# Patient Record
Sex: MALE | Race: WHITE | NOT HISPANIC OR LATINO | Employment: OTHER | URBAN - METROPOLITAN AREA
[De-identification: names, ages, dates, MRNs, and addresses within clinical notes are randomized per-mention and may not be internally consistent; named-entity substitution may affect disease eponyms.]

---

## 2018-08-20 ENCOUNTER — TRANSCRIBE ORDERS (OUTPATIENT)
Dept: SLEEP CENTER | Facility: CLINIC | Age: 68
End: 2018-08-20

## 2018-08-20 DIAGNOSIS — R06.83 SNORING: Primary | ICD-10-CM

## 2020-02-25 PROCEDURE — 87205 SMEAR GRAM STAIN: CPT | Performed by: PHYSICIAN ASSISTANT

## 2020-02-25 PROCEDURE — 87070 CULTURE OTHR SPECIMN AEROBIC: CPT | Performed by: PHYSICIAN ASSISTANT

## 2021-03-10 DIAGNOSIS — Z23 ENCOUNTER FOR IMMUNIZATION: ICD-10-CM

## 2024-12-27 ENCOUNTER — HOSPITAL ENCOUNTER (EMERGENCY)
Facility: HOSPITAL | Age: 74
Discharge: HOME | End: 2024-12-27
Attending: EMERGENCY MEDICINE
Payer: MEDICARE

## 2024-12-27 VITALS
BODY MASS INDEX: 27.35 KG/M2 | TEMPERATURE: 98.6 F | OXYGEN SATURATION: 98 % | DIASTOLIC BLOOD PRESSURE: 79 MMHG | RESPIRATION RATE: 16 BRPM | WEIGHT: 185.19 LBS | HEART RATE: 61 BPM | SYSTOLIC BLOOD PRESSURE: 193 MMHG

## 2024-12-27 DIAGNOSIS — R03.0 ELEVATED BLOOD PRESSURE READING: ICD-10-CM

## 2024-12-27 DIAGNOSIS — R79.89 ELEVATED SERUM CREATININE: ICD-10-CM

## 2024-12-27 DIAGNOSIS — J02.9 PHARYNGITIS: Primary | ICD-10-CM

## 2024-12-27 LAB
ALBUMIN SERPL BCG-MCNC: 3.6 G/DL (ref 3.5–5)
ALP SERPL-CCNC: 85 U/L (ref 34–104)
ALT SERPL W P-5'-P-CCNC: 23 U/L (ref 7–52)
ANION GAP SERPL CALCULATED.3IONS-SCNC: 4 MMOL/L (ref 4–13)
AST SERPL W P-5'-P-CCNC: 21 U/L (ref 13–39)
BASOPHILS # BLD AUTO: 0.03 THOUSANDS/ÂΜL (ref 0–0.1)
BASOPHILS NFR BLD AUTO: 0 % (ref 0–1)
BILIRUB SERPL-MCNC: 1.75 MG/DL (ref 0.2–1)
BILIRUB UR QL STRIP: NEGATIVE
BUN SERPL-MCNC: 23 MG/DL (ref 5–25)
CALCIUM SERPL-MCNC: 7.9 MG/DL (ref 8.4–10.2)
CHLORIDE SERPL-SCNC: 110 MMOL/L (ref 96–108)
CLARITY UR: CLEAR
CO2 SERPL-SCNC: 22 MMOL/L (ref 21–32)
COLOR UR: COLORLESS
CREAT SERPL-MCNC: 1.32 MG/DL (ref 0.6–1.3)
EOSINOPHIL # BLD AUTO: 0.1 THOUSAND/ÂΜL (ref 0–0.61)
EOSINOPHIL NFR BLD AUTO: 1 % (ref 0–6)
ERYTHROCYTE [DISTWIDTH] IN BLOOD BY AUTOMATED COUNT: 14 % (ref 11.6–15.1)
GFR SERPL CREATININE-BSD FRML MDRD: 52 ML/MIN/1.73SQ M
GLUCOSE SERPL-MCNC: 89 MG/DL (ref 65–140)
GLUCOSE UR STRIP-MCNC: NEGATIVE MG/DL
HCT VFR BLD AUTO: 45.7 % (ref 36.5–49.3)
HGB BLD-MCNC: 15.1 G/DL (ref 12–17)
HGB UR QL STRIP.AUTO: NEGATIVE
IMM GRANULOCYTES # BLD AUTO: 0.02 THOUSAND/UL (ref 0–0.2)
IMM GRANULOCYTES NFR BLD AUTO: 0 % (ref 0–2)
KETONES UR STRIP-MCNC: NEGATIVE MG/DL
LEUKOCYTE ESTERASE UR QL STRIP: NEGATIVE
LYMPHOCYTES # BLD AUTO: 3.08 THOUSANDS/ÂΜL (ref 0.6–4.47)
LYMPHOCYTES NFR BLD AUTO: 35 % (ref 14–44)
MCH RBC QN AUTO: 30.1 PG (ref 26.8–34.3)
MCHC RBC AUTO-ENTMCNC: 33 G/DL (ref 31.4–37.4)
MCV RBC AUTO: 91 FL (ref 82–98)
MONOCYTES # BLD AUTO: 0.87 THOUSAND/ÂΜL (ref 0.17–1.22)
MONOCYTES NFR BLD AUTO: 10 % (ref 4–12)
NEUTROPHILS # BLD AUTO: 4.66 THOUSANDS/ÂΜL (ref 1.85–7.62)
NEUTS SEG NFR BLD AUTO: 54 % (ref 43–75)
NITRITE UR QL STRIP: NEGATIVE
NRBC BLD AUTO-RTO: 0 /100 WBCS
PH UR STRIP.AUTO: 5 [PH]
PLATELET # BLD AUTO: 219 THOUSANDS/UL (ref 149–390)
PMV BLD AUTO: 10.3 FL (ref 8.9–12.7)
POTASSIUM SERPL-SCNC: 4.2 MMOL/L (ref 3.5–5.3)
PROT SERPL-MCNC: 6.3 G/DL (ref 6.4–8.4)
PROT UR STRIP-MCNC: NEGATIVE MG/DL
RBC # BLD AUTO: 5.02 MILLION/UL (ref 3.88–5.62)
SODIUM SERPL-SCNC: 136 MMOL/L (ref 135–147)
SP GR UR STRIP.AUTO: 1 (ref 1–1.03)
UROBILINOGEN UR STRIP-ACNC: <2 MG/DL
WBC # BLD AUTO: 8.76 THOUSAND/UL (ref 4.31–10.16)

## 2024-12-27 PROCEDURE — 99284 EMERGENCY DEPT VISIT MOD MDM: CPT

## 2024-12-27 PROCEDURE — 99283 EMERGENCY DEPT VISIT LOW MDM: CPT

## 2024-12-27 PROCEDURE — 96361 HYDRATE IV INFUSION ADD-ON: CPT

## 2024-12-27 PROCEDURE — 36415 COLL VENOUS BLD VENIPUNCTURE: CPT

## 2024-12-27 PROCEDURE — 85025 COMPLETE CBC W/AUTO DIFF WBC: CPT

## 2024-12-27 PROCEDURE — 81003 URINALYSIS AUTO W/O SCOPE: CPT

## 2024-12-27 PROCEDURE — 80053 COMPREHEN METABOLIC PANEL: CPT

## 2024-12-27 PROCEDURE — 96374 THER/PROPH/DIAG INJ IV PUSH: CPT

## 2024-12-27 RX ORDER — ACETAMINOPHEN 500 MG
500 TABLET ORAL EVERY 6 HOURS PRN
Qty: 30 TABLET | Refills: 0 | Status: SHIPPED | OUTPATIENT
Start: 2024-12-27

## 2024-12-27 RX ORDER — LIDOCAINE HYDROCHLORIDE 20 MG/ML
15 SOLUTION OROPHARYNGEAL 4 TIMES DAILY PRN
Qty: 100 ML | Refills: 0 | Status: SHIPPED | OUTPATIENT
Start: 2024-12-27

## 2024-12-27 RX ORDER — LIDOCAINE HYDROCHLORIDE 20 MG/ML
15 SOLUTION OROPHARYNGEAL ONCE
Status: COMPLETED | OUTPATIENT
Start: 2024-12-27 | End: 2024-12-27

## 2024-12-27 RX ORDER — ACETAMINOPHEN 10 MG/ML
1000 INJECTION, SOLUTION INTRAVENOUS ONCE
Status: COMPLETED | OUTPATIENT
Start: 2024-12-27 | End: 2024-12-27

## 2024-12-27 RX ADMIN — LIDOCAINE HYDROCHLORIDE 15 ML: 20 SOLUTION ORAL at 19:51

## 2024-12-27 RX ADMIN — SODIUM CHLORIDE 2000 ML: 0.9 INJECTION, SOLUTION INTRAVENOUS at 19:48

## 2024-12-27 RX ADMIN — ACETAMINOPHEN 1000 MG: 10 INJECTION INTRAVENOUS at 19:50

## 2024-12-28 NOTE — ED PROVIDER NOTES
Time reflects when diagnosis was documented in both MDM as applicable and the Disposition within this note       Time User Action Codes Description Comment    12/27/2024 10:05 PM Mery Hammer Add [J02.9] Pharyngitis     12/27/2024 10:05 PM Mery Hammer Add [R79.89] Elevated serum creatinine     12/27/2024 10:09 PM Mery Hammer Add [R03.0] Elevated blood pressure reading           ED Disposition       ED Disposition   Discharge    Condition   Stable    Date/Time   Fri Dec 27, 2024 10:05 PM    Comment   Mayito Canass discharge to home/self care.                   Assessment & Plan       Medical Decision Making  Will give 2 L NS. Following hydration, Will obtain CBC to re-evaluate for hemoconcentration.  Will obtain repeat CMP to evaluate kidney function, for electrolyte disturbance. Will obtain UA.     Repeat lab work reveals improvement in creatinine, resolution of proteinuria, and resolution of hemoconcentration.  No actionable derangement.  On reexamination, patient reports improvement in sore throat following treatment in ED.  Patient is hypertensive at time of discharge, though he states he was unable to take his metoprolol today and will take when he gets home.  Given that he has no other symptoms such as chest pain, headache, dyspnea, will discharge.  Patient agreeable to follow-up closely with his nephrologist, who he did call today to inform him that he was going to the ED.    At the time of discharge, the patient is in no acute distress. I discussed with the patient the diagnosis, treatment plan, follow-up, return precautions, and discharge instructions; they were given the opportunity to ask questions and verbalized understanding.    Problems Addressed:  Elevated blood pressure reading: acute illness or injury  Elevated serum creatinine: acute illness or injury  Pharyngitis: acute illness or injury    Amount and/or Complexity of Data Reviewed  External Data Reviewed: labs and notes.  Labs:  ordered. Decision-making details documented in ED Course.  Discussion of management or test interpretation with external provider(s): ED attending (Dr. Duff)    Risk  OTC drugs.  Prescription drug management.        ED Course as of 12/28/24 0556   Fri Dec 27, 2024   1927 Baseline Cr 1.1   2108 Hemoglobin: 15.1   2108 Hematocrit: 45.7   2117 POCT URINE PROTEIN: Negative   2129 Creatinine(!): 1.32       Medications   sodium chloride 0.9 % bolus 2,000 mL (0 mL Intravenous Stopped 12/27/24 2104)   acetaminophen (Ofirmev) injection 1,000 mg (0 mg Intravenous Stopped 12/27/24 2005)   Lidocaine Viscous HCl (XYLOCAINE) 2 % mucosal solution 15 mL (15 mL Swish & Swallow Given 12/27/24 1951)       ED Risk Strat Scores                          SBIRT 20yo+      Flowsheet Row Most Recent Value   Initial Alcohol Screen: US AUDIT-C     1. How often do you have a drink containing alcohol? 0 Filed at: 12/27/2024 1824   2. How many drinks containing alcohol do you have on a typical day you are drinking?  0 Filed at: 12/27/2024 1824   3a. Male UNDER 65: How often do you have five or more drinks on one occasion? 0 Filed at: 12/27/2024 1824   3b. FEMALE Any Age, or MALE 65+: How often do you have 4 or more drinks on one occassion? 0 Filed at: 12/27/2024 1824   Audit-C Score 0 Filed at: 12/27/2024 1824   MIKE: How many times in the past year have you...    Used an illegal drug or used a prescription medication for non-medical reasons? Never Filed at: 12/27/2024 1824                            History of Present Illness       Chief Complaint   Patient presents with    Sore Throat - Complicated     Patient arrives with c/o a sore throat starting 2 days ago. Patient complains of difficulty swallowing and voice changes. Patient also recovering from a stomach virus and, per patient, his doctor told him he needs IV hydration.        Past Medical History:   Diagnosis Date    Glaucoma     Hypertension     Kidney transplanted       Past Surgical  History:   Procedure Laterality Date    CHOLECYSTECTOMY      EYE SURGERY      [place] LUMBAR PUNCTURE DIAGNOSTIC  5/17/2015    KIDNEY SURGERY      transplant    REPLACEMENT TOTAL KNEE        History reviewed. No pertinent family history.   Social History     Tobacco Use    Smoking status: Former     Types: Cigarettes     Start date: 2002    Smokeless tobacco: Never   Vaping Use    Vaping status: Never Used   Substance Use Topics    Alcohol use: Yes    Drug use: Never      E-Cigarette/Vaping    E-Cigarette Use Never User       E-Cigarette/Vaping Substances    Nicotine No     THC No     CBD No     Flavoring No     Other No     Unknown No       I have reviewed and agree with the history as documented.     The patient is a 74-year-old male with history of renal transplant 2010 who presents to the ED for evaluation of dehydration.  He reports that 6 days ago, he developed abdominal pain, vomiting, diarrhea.  These symptoms have since resolved, however yesterday he developed a change in his voice.  Today, he developed a sore throat, and pain with swallowing.  He has been able to tolerate orals liquids and solids, and oral secretions.  He reports he did have chills last night.  He was seen at urgent care during which she had labs done.  His creatinine was noted to be 1.4, hemoglobin 18.1, hematocrit 54.2, he is noted to have elevated monocytes at 8.7%, and protein in his urine.  COVID and flu testing were negative, as well as strep testing.  He was sent to the ED for hydration.  He initially went to an outside hospital and waited but was not seen, and came to our facility instead.  He otherwise denies chest pain, dyspnea, ongoing abdominal pain/nausea/vomiting, dysuria, hematuria, decreased urine output, recent travel.          Review of Systems   Constitutional:  Negative for chills and fever.   HENT:  Positive for sore throat. Negative for congestion and rhinorrhea.    Respiratory:  Negative for cough and shortness of breath.     Cardiovascular:  Negative for chest pain and leg swelling.   Gastrointestinal:  Positive for abdominal pain (resolved), diarrhea (resolved) and vomiting (resolved). Negative for constipation.   Genitourinary:  Negative for dysuria and flank pain.   Musculoskeletal:  Negative for arthralgias and myalgias.   Skin:  Negative for rash and wound.   Neurological:  Negative for dizziness, numbness and headaches.           Objective       ED Triage Vitals   Temperature Pulse Blood Pressure Respirations SpO2 Patient Position - Orthostatic VS   12/27/24 1820 12/27/24 1820 12/27/24 1823 12/27/24 1820 12/27/24 1823 12/27/24 1823   98.6 °F (37 °C) 81 161/74 18 98 % Sitting      Temp Source Heart Rate Source BP Location FiO2 (%) Pain Score    12/27/24 1820 12/27/24 1820 12/27/24 1823 -- --    Oral Monitor Right arm        Vitals      Date and Time Temp Pulse SpO2 Resp BP Pain Score FACES Pain Rating User   12/27/24 2208 -- 61 98 % 16 193/79 -- -- BRB   12/27/24 2104 -- 68 98 % 16 173/79 -- -- BRB   12/27/24 1823 -- -- 98 % -- 161/74 -- -- EK   12/27/24 1820 98.6 °F (37 °C) 81 -- 18 -- -- -- EK            Physical Exam  Vitals and nursing note reviewed.   Constitutional:       General: He is not in acute distress.     Appearance: He is well-developed. He is not toxic-appearing.   HENT:      Head: Normocephalic and atraumatic.      Mouth/Throat:      Mouth: Mucous membranes are dry. No angioedema.      Pharynx: Oropharynx is clear. Uvula midline. Posterior oropharyngeal erythema present. No oropharyngeal exudate or uvula swelling.      Comments: Normal phonation. Tolerating oral secretions  Eyes:      Conjunctiva/sclera: Conjunctivae normal.   Cardiovascular:      Rate and Rhythm: Normal rate and regular rhythm.      Heart sounds: No murmur heard.  Pulmonary:      Effort: Pulmonary effort is normal. No respiratory distress.      Breath sounds: Normal breath sounds.   Abdominal:      Palpations: Abdomen is soft.      Tenderness:  There is no abdominal tenderness. There is no guarding or rebound.   Musculoskeletal:         General: No swelling.      Cervical back: Neck supple.      Right lower leg: No edema.      Left lower leg: No edema.   Skin:     General: Skin is warm and dry.      Capillary Refill: Capillary refill takes less than 2 seconds.   Neurological:      Mental Status: He is alert.   Psychiatric:         Mood and Affect: Mood normal.         Results Reviewed       Procedure Component Value Units Date/Time    Comprehensive metabolic panel [[id number]]  (Abnormal) Collected: 12/27/24 2055    Lab Status: Final result Specimen: Blood from Arm, Right Updated: 12/27/24 2123     Sodium 136 mmol/L      Potassium 4.2 mmol/L      Chloride 110 mmol/L      CO2 22 mmol/L      ANION GAP 4 mmol/L      BUN 23 mg/dL      Creatinine 1.32 mg/dL      Glucose 89 mg/dL      Calcium 7.9 mg/dL      AST 21 U/L      ALT 23 U/L      Alkaline Phosphatase 85 U/L      Total Protein 6.3 g/dL      Albumin 3.6 g/dL      Total Bilirubin 1.75 mg/dL      eGFR 52 ml/min/1.73sq m     Narrative:      [employer] guidelines for Chronic Kidney Disease (CKD):     Stage 1 with normal or high GFR (GFR > 90 mL/min/1.73 square meters)    Stage 2 Mild CKD (GFR = 60-89 mL/min/1.73 square meters)    Stage 3A Moderate CKD (GFR = 45-59 mL/min/1.73 square meters)    Stage 3B Moderate CKD (GFR = 30-44 mL/min/1.73 square meters)    Stage 4 Severe CKD (GFR = 15-29 mL/min/1.73 square meters)    Stage 5 End Stage CKD (GFR <15 mL/min/1.73 square meters)  Note: GFR calculation is accurate only with a steady state creatinine    UA w Reflex to Microscopic w Reflex to Culture [[id number]] Collected: 12/27/24 2052    Lab Status: Final result Specimen: Urine, Other Updated: 12/27/24 2115     Color, UA Colorless     Clarity, UA Clear     Specific Gravity, UA 1.003     pH, UA 5.0     Leukocytes, UA Negative     Nitrite, UA Negative     Protein, UA Negative mg/dl       Glucose, UA Negative mg/dl      Ketones, UA Negative mg/dl      Urobilinogen, UA <2.0 mg/dl      Bilirubin, UA Negative     Occult Blood, UA Negative    CBC and differential [[id number]] Collected: 24    Lab Status: Final result Specimen: Blood from Arm, Right Updated: 24     WBC 8.76 Thousand/uL      RBC 5.02 Million/uL      Hemoglobin 15.1 g/dL      Hematocrit 45.7 %      MCV 91 fL      MCH 30.1 pg      MCHC 33.0 g/dL      RDW 14.0 %      MPV 10.3 fL      Platelets 219 Thousands/uL      nRBC 0 /100 WBCs      Segmented % 54 %      Immature Grans % 0 %      Lymphocytes % 35 %      Monocytes % 10 %      Eosinophils Relative 1 %      Basophils Relative 0 %      Absolute Neutrophils 4.66 Thousands/µL      Absolute Immature Grans 0.02 Thousand/uL      Absolute Lymphocytes 3.08 Thousands/µL      Absolute Monocytes 0.87 Thousand/µL      Eosinophils Absolute 0.10 Thousand/µL      Basophils Absolute 0.03 Thousands/µL             No orders to display       Procedures    ED Medication and Procedure Management   Prior to Admission Medications   Prescriptions Last Dose Informant Patient Reported? Taking?   ALPRAZolam (XANAX) 0.5 mg tablet  Self Yes No   Sig: Take 0.5 mg by mouth daily as needed   MAGNESIUM OXIDE 400 PO  Self Yes No   Sig: Take 400 mg by mouth 2 (two) times a day   bimatoprost (LUMIGAN) 0.01 % ophthalmic drops  Self Yes No   Sig: as directed   brimonidine (ALPHAGAN P) 0.1 %  Self Yes No   Sig: as directed   escitalopram (LEXAPRO) 20 mg tablet  Self Yes No   Sig: Take 1 tablet by mouth   metoprolol tartrate (LOPRESSOR) 25 mg tablet  Self Yes No   Sig: Take 25 mg by mouth   mycophenolic acid (MYFORTIC) 180 mg EC tablet  Self Yes No   Sig: Take 360 mg by mouth   tacrolimus (PROGRAF) 1 mg capsule  Self Yes No   Si capsule daily      Facility-Administered Medications: None     Discharge Medication List as of 2024 10:10 PM        START taking these medications    Details   acetaminophen  (TYLENOL) 500 mg tablet Take 1 tablet (500 mg total) by mouth every 6 (six) hours as needed for mild pain or moderate pain, Starting Fri 12/27/2024, Normal      Lidocaine Viscous HCl (XYLOCAINE) 2 % mucosal solution Swish and spit 15 mL 4 (four) times a day as needed for mouth pain or discomfort or mucositis, Starting Fri 12/27/2024, Normal           CONTINUE these medications which have NOT CHANGED    Details   ALPRAZolam (XANAX) 0.5 mg tablet Take 0.5 mg by mouth daily as needed, Historical Med      bimatoprost (LUMIGAN) 0.01 % ophthalmic drops as directed, Historical Med      brimonidine (ALPHAGAN P) 0.1 % as directed, Historical Med      escitalopram (LEXAPRO) 20 mg tablet Take 1 tablet by mouth, Historical Med      MAGNESIUM OXIDE 400 PO Take 400 mg by mouth 2 (two) times a day, Historical Med      metoprolol tartrate (LOPRESSOR) 25 mg tablet Take 25 mg by mouth, Historical Med      mycophenolic acid (MYFORTIC) 180 mg EC tablet Take 360 mg by mouth, Starting Mon 1/20/2020, Historical Med      tacrolimus (PROGRAF) 1 mg capsule 1 capsule daily, Historical Med           No discharge procedures on file.  ED SEPSIS DOCUMENTATION   Time reflects when diagnosis was documented in both MDM as applicable and the Disposition within this note       Time User Action Codes Description Comment    12/27/2024 10:05 PM Mery Hammer [J02.9] Pharyngitis     12/27/2024 10:05 PM Mery Hammer [R79.89] Elevated serum creatinine     12/27/2024 10:09 PM Mery Hammer [R03.0] Elevated blood pressure reading                  Mery Hammer PA-C  12/28/24 [id number]       Mery Hammer PA-C  12/28/24 2057

## 2024-12-28 NOTE — DISCHARGE INSTRUCTIONS
Take Tylenol as prescribed as needed for sore throat    Use viscous lidocaine as needed for sore throat to numb throat prior to eating    Make sure to drink plenty of water over the next several days    Call your PCP and nephrologist as soon as possible     Return to the emergency room for worsening symptoms, inability to swallow saliva/liquids, decreased urine output, dark urine, trouble breathing, abdominal pain, or any other new/concerning symptoms